# Patient Record
Sex: MALE | Race: BLACK OR AFRICAN AMERICAN | NOT HISPANIC OR LATINO | URBAN - METROPOLITAN AREA
[De-identification: names, ages, dates, MRNs, and addresses within clinical notes are randomized per-mention and may not be internally consistent; named-entity substitution may affect disease eponyms.]

---

## 2024-04-19 ENCOUNTER — OV NP (OUTPATIENT)
Dept: URBAN - METROPOLITAN AREA CLINIC 72 | Facility: CLINIC | Age: 59
End: 2024-04-19
Payer: SELF-PAY

## 2024-04-19 VITALS
TEMPERATURE: 97.1 F | HEIGHT: 71 IN | DIASTOLIC BLOOD PRESSURE: 78 MMHG | WEIGHT: 137.4 LBS | BODY MASS INDEX: 19.23 KG/M2 | HEART RATE: 73 BPM | SYSTOLIC BLOOD PRESSURE: 128 MMHG

## 2024-04-19 DIAGNOSIS — K70.11 ALCOHOLIC HEPATITIS WITH ASCITES: ICD-10-CM

## 2024-04-19 DIAGNOSIS — G47.00 INSOMNIA, UNSPECIFIED TYPE: ICD-10-CM

## 2024-04-19 PROBLEM — 1082611000119101: Status: ACTIVE | Noted: 2024-04-19

## 2024-04-19 PROBLEM — 193462001: Status: ACTIVE | Noted: 2024-04-19

## 2024-04-19 PROCEDURE — 99204 OFFICE O/P NEW MOD 45 MIN: CPT | Performed by: NURSE PRACTITIONER

## 2024-04-19 RX ORDER — FUROSEMIDE 20 MG/1
TAKE ONE TABLET BY MOUTH ONE TIME DAILY TABLET ORAL
Qty: 30 UNSPECIFIED | Refills: 0 | Status: ACTIVE | COMMUNITY

## 2024-04-19 RX ORDER — METHYLPREDNISOLONE 4 MG/1
TAKE BY MOUTH AS DIRECTED ON INSIDE OF PACKAGE TABLET ORAL
Qty: 21 EACH | Refills: 0 | Status: ON HOLD | COMMUNITY

## 2024-04-19 RX ORDER — LISINOPRIL 10 MG/1
TAKE 1 TABLET BY MOUTH ONCE DAILY TABLET ORAL
Qty: 30 EACH | Refills: 5 | Status: ON HOLD | COMMUNITY

## 2024-04-19 RX ORDER — SPIRONOLACTONE 25 MG/1
TAKE ONE TABLET BY MOUTH ONE TIME DAILY TABLET, COATED ORAL
Qty: 30 UNSPECIFIED | Refills: 0 | Status: ACTIVE | COMMUNITY

## 2024-04-19 RX ORDER — HYDROXYZINE HYDROCHLORIDE 25 MG/1
1 TABLET TABLET, FILM COATED ORAL AT BEDTIME
Qty: 30 | Refills: 0 | OUTPATIENT
Start: 2024-04-19

## 2024-04-19 RX ORDER — LORAZEPAM 0.5 MG/1
TAKE ONE TABLET BY MOUTH THREE TIMES A DAY FOR 5 DAYS TABLET ORAL
Qty: 15 UNSPECIFIED | Refills: 0 | Status: ON HOLD | COMMUNITY

## 2024-04-19 RX ORDER — LISINOPRIL 10 MG/1
TAKE ONE TABLET BY MOUTH ONE TIME DAILY TABLET ORAL
Qty: 90 UNSPECIFIED | Refills: 0 | Status: ACTIVE | COMMUNITY

## 2024-04-19 NOTE — HPI-TODAY'S VISIT:
58-year-old male new to the clinic here for an ER follow-up with probable cirrhosis.  Past medical history of hypertension heavy alcohol use drinks 1/5 of vodka a day. Seen in the ER 4/1/24 for abdominal pain and bloating for 3 days.  CT revealed ascites and hepatic steatosis.  He was discharged home on furosemide 20 mg, spironolactone 25 mg and Ativan.  Advised to follow-up with GI.  He has abstained from alcohol since his hospital discharge. He has been off the diuretic medicine for 2 days.  His stomach is back to normal.  He doesn't have insurance and stresses that seeing me was a financial strain. He does report insomnia.  He does not have a PCP.

## 2024-04-19 NOTE — HPI-OTHER HISTORIES
CT abdomen and pelvis with contrast 4/1/2024. Liver reveals hepatic steatosis. Small right renal cyst. Moderate amount of ascites in the abdomen.  Labs 4/1/2024. CMP: Sodium 137, potassium 3, creatinine 0.77, alk phos 141, AST 98, total bilirubin 2. Lipase 33. CBC: Hgb 11.5, RBC 3.25, HCT 33.4, platelet 121. INR 1.1. PT 12.

## 2024-05-16 ENCOUNTER — ERX REFILL RESPONSE (OUTPATIENT)
Dept: URBAN - METROPOLITAN AREA CLINIC 72 | Facility: CLINIC | Age: 59
End: 2024-05-16

## 2024-05-16 RX ORDER — HYDROXYZINE HYDROCHLORIDE 25 MG/1
1 TABLET TABLET, FILM COATED ORAL AT BEDTIME
Qty: 30 | Refills: 0 | OUTPATIENT

## 2024-06-11 ENCOUNTER — ERX REFILL RESPONSE (OUTPATIENT)
Dept: URBAN - METROPOLITAN AREA CLINIC 72 | Facility: CLINIC | Age: 59
End: 2024-06-11

## 2024-06-11 RX ORDER — HYDROXYZINE HYDROCHLORIDE 25 MG/1
1 TABLET TABLET, FILM COATED ORAL AT BEDTIME
Qty: 30 | Refills: 0 | OUTPATIENT

## 2024-06-11 RX ORDER — HYDROXYZINE HYDROCHLORIDE 25 MG/1
TAKE 1 TABLET BY MOUTH AT BEDTIME TABLET ORAL
Qty: 30 TABLET | Refills: 0 | OUTPATIENT

## 2024-06-25 ENCOUNTER — DASHBOARD ENCOUNTERS (OUTPATIENT)
Age: 59
End: 2024-06-25

## 2024-06-25 ENCOUNTER — OFFICE VISIT (OUTPATIENT)
Dept: URBAN - METROPOLITAN AREA CLINIC 72 | Facility: CLINIC | Age: 59
End: 2024-06-25

## 2024-06-25 VITALS
WEIGHT: 154.6 LBS | BODY MASS INDEX: 21.65 KG/M2 | DIASTOLIC BLOOD PRESSURE: 77 MMHG | HEIGHT: 71 IN | SYSTOLIC BLOOD PRESSURE: 142 MMHG | HEART RATE: 74 BPM | TEMPERATURE: 97.9 F

## 2024-06-25 RX ORDER — SPIRONOLACTONE 25 MG/1
TAKE ONE TABLET BY MOUTH ONE TIME DAILY TABLET, COATED ORAL
Qty: 30 UNSPECIFIED | Refills: 0 | Status: ON HOLD | COMMUNITY

## 2024-06-25 RX ORDER — METHYLPREDNISOLONE 4 MG/1
TAKE BY MOUTH AS DIRECTED ON INSIDE OF PACKAGE TABLET ORAL
Qty: 21 EACH | Refills: 0 | Status: ON HOLD | COMMUNITY

## 2024-06-25 RX ORDER — LISINOPRIL 10 MG/1
TAKE 1 TABLET BY MOUTH ONCE DAILY TABLET ORAL
Qty: 30 EACH | Refills: 5 | Status: ON HOLD | COMMUNITY

## 2024-06-25 RX ORDER — LORAZEPAM 0.5 MG/1
TAKE ONE TABLET BY MOUTH THREE TIMES A DAY FOR 5 DAYS TABLET ORAL
Qty: 15 UNSPECIFIED | Refills: 0 | Status: ON HOLD | COMMUNITY

## 2024-06-25 RX ORDER — FUROSEMIDE 20 MG/1
TAKE ONE TABLET BY MOUTH ONE TIME DAILY TABLET ORAL
Qty: 30 UNSPECIFIED | Refills: 0 | Status: ON HOLD | COMMUNITY

## 2024-06-25 RX ORDER — LISINOPRIL 10 MG/1
TAKE ONE TABLET BY MOUTH ONE TIME DAILY TABLET ORAL
Qty: 90 UNSPECIFIED | Refills: 0 | Status: ACTIVE | COMMUNITY

## 2024-06-25 RX ORDER — HYDROXYZINE HYDROCHLORIDE 25 MG/1
TAKE 1 TABLET BY MOUTH AT BEDTIME TABLET ORAL
Qty: 30 TABLET | Refills: 0 | Status: ON HOLD | COMMUNITY

## 2024-06-25 NOTE — HPI-TODAY'S VISIT:
Pleasant 58-year-old male returns for follow-up, last seen in office on 4/18/2024.  He has history of alcoholic hepatitis with cirrhosis he has had ascites he has responded to diuretic therapy. Lab work 5/9/2024 showed a sodium 142 creatinine 0.81 potassium 5.4  Patient expresses frustration that he try to cancel his appointment because he was told everything was fine.  I have no documentation of this.  I asked questions how he is doing and he continues to simply ask if I reviewed his lab work.  He became more frustrated and demanded a refund of his cash paying appointment.  I tried to go over lab results and to ask questions on how patient was doing including encouraging him ongoing abstinence from alcohol but he would not respond.  I left the room, manager notified.  Refund initiated to patient.  Encouraged follow-up with PCP.

## 2024-07-06 ENCOUNTER — ERX REFILL RESPONSE (OUTPATIENT)
Dept: URBAN - METROPOLITAN AREA CLINIC 72 | Facility: CLINIC | Age: 59
End: 2024-07-06

## 2024-07-06 RX ORDER — HYDROXYZINE HYDROCHLORIDE 25 MG/1
TAKE 1 TABLET BY MOUTH AT BEDTIME TABLET ORAL
Qty: 30 TABLET | Refills: 0 | OUTPATIENT

## 2024-08-02 ENCOUNTER — ERX REFILL RESPONSE (OUTPATIENT)
Dept: URBAN - METROPOLITAN AREA CLINIC 72 | Facility: CLINIC | Age: 59
End: 2024-08-02

## 2024-08-02 RX ORDER — HYDROXYZINE HYDROCHLORIDE 25 MG/1
TAKE 1 TABLET BY MOUTH AT BEDTIME TABLET ORAL
Qty: 30 TABLET | Refills: 0 | OUTPATIENT